# Patient Record
Sex: FEMALE
[De-identification: names, ages, dates, MRNs, and addresses within clinical notes are randomized per-mention and may not be internally consistent; named-entity substitution may affect disease eponyms.]

---
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
## 2022-08-11 ENCOUNTER — OFFICE (OUTPATIENT)
Dept: URBAN - METROPOLITAN AREA CLINIC 79 | Facility: CLINIC | Age: 22
End: 2022-08-11
Payer: MEDICAID

## 2022-08-11 VITALS
HEIGHT: 61 IN | HEART RATE: 81 BPM | TEMPERATURE: 98.1 F | SYSTOLIC BLOOD PRESSURE: 106 MMHG | DIASTOLIC BLOOD PRESSURE: 76 MMHG | WEIGHT: 143 LBS

## 2022-08-11 DIAGNOSIS — R12 HEARTBURN: ICD-10-CM

## 2022-08-11 DIAGNOSIS — R05.3 CHRONIC COUGH: ICD-10-CM

## 2022-08-11 DIAGNOSIS — R10.10 UPPER ABDOMINAL PAIN, UNSPECIFIED: ICD-10-CM

## 2022-08-11 DIAGNOSIS — K59.09 OTHER CONSTIPATION: ICD-10-CM

## 2022-08-11 DIAGNOSIS — R11.0 NAUSEA: ICD-10-CM

## 2022-08-11 DIAGNOSIS — R00.2 PALPITATIONS: ICD-10-CM

## 2022-08-11 PROCEDURE — 99204 OFFICE O/P NEW MOD 45 MIN: CPT | Performed by: PHYSICIAN ASSISTANT

## 2022-08-11 NOTE — SERVICEHPINOTES
JAMIL SILVA   is a   22   year old   white  female who is being seen in consultation at the request of   ANDREW CHAVIRA   for abdominal pain, nausea, heartburn that has been going since May 2022. She recalls having "bad hangover," then ever since that event severe heartburn, bloating, and stomach "pains." She was last seen at [facility] in May for initial evaluation that led to start of Omeprazole 20 mg qd AM and Famotidine 20 mg q hs. She does not want to continue taking PPI due to concern about becoming "dependent" on it Last use of PPI in July 30. She was also seen by PCP in early July for h pylori breath test negative. She has cut down on ETOH. No tobacco nicotine use. Chronic cough worse at night h/o seasonal allergies on Loratadine. Per patient "former" marijuana abuse Last used in May. She also notes constipation new over the past 3 months: BMs qod, BSS type 2. No trial of fiber diet. Poor water intake. Unsure of fiber intake in diet. On ROS "heart palpitations" noted due to anxiety. Rare NSAID use. Denies chest pain, dyspnea, lower abdominal pain, change in BMs, melena, weight loss. br
br She notes allergy to clarithromycin topical gel used for acne and resulting in "rash."  br br br

## 2022-09-14 ENCOUNTER — OFFICE (OUTPATIENT)
Dept: URBAN - METROPOLITAN AREA CLINIC 30 | Facility: CLINIC | Age: 22
End: 2022-09-14
Payer: MEDICAID

## 2022-09-14 VITALS
DIASTOLIC BLOOD PRESSURE: 72 MMHG | OXYGEN SATURATION: 98 % | SYSTOLIC BLOOD PRESSURE: 113 MMHG | HEART RATE: 80 BPM | HEART RATE: 101 BPM | TEMPERATURE: 98.7 F | HEART RATE: 84 BPM | SYSTOLIC BLOOD PRESSURE: 114 MMHG | RESPIRATION RATE: 14 BRPM | SYSTOLIC BLOOD PRESSURE: 126 MMHG | TEMPERATURE: 97.9 F | OXYGEN SATURATION: 100 % | WEIGHT: 143 LBS | HEIGHT: 61 IN

## 2022-09-14 DIAGNOSIS — R12 HEARTBURN: ICD-10-CM

## 2022-09-14 DIAGNOSIS — K20.80 OTHER ESOPHAGITIS WITHOUT BLEEDING: ICD-10-CM

## 2022-09-14 DIAGNOSIS — R00.2 PALPITATIONS: ICD-10-CM

## 2022-09-14 DIAGNOSIS — K44.9 DIAPHRAGMATIC HERNIA WITHOUT OBSTRUCTION OR GANGRENE: ICD-10-CM

## 2022-09-14 DIAGNOSIS — K29.60 OTHER GASTRITIS WITHOUT BLEEDING: ICD-10-CM

## 2022-09-14 DIAGNOSIS — R05.9 COUGH, UNSPECIFIED: ICD-10-CM

## 2022-09-14 DIAGNOSIS — K31.89 OTHER DISEASES OF STOMACH AND DUODENUM: ICD-10-CM

## 2022-09-14 DIAGNOSIS — B96.81 HELICOBACTER PYLORI [H. PYLORI] AS THE CAUSE OF DISEASES CLA: ICD-10-CM

## 2022-09-14 DIAGNOSIS — R11.0 NAUSEA: ICD-10-CM

## 2022-09-14 LAB
GI UPPER EGD HISOLOGY - SPECM 1 JAR(S): 2: (no result)
GI UPPER EGD HISOLOGY - SPECM 1 JAR(S): 2: PDF REPORT: (no result)

## 2022-11-10 ENCOUNTER — OFFICE (OUTPATIENT)
Dept: URBAN - METROPOLITAN AREA CLINIC 79 | Facility: CLINIC | Age: 22
End: 2022-11-10
Payer: MEDICAID

## 2022-11-10 VITALS
HEIGHT: 61 IN | DIASTOLIC BLOOD PRESSURE: 65 MMHG | TEMPERATURE: 97.7 F | HEART RATE: 73 BPM | SYSTOLIC BLOOD PRESSURE: 106 MMHG | WEIGHT: 146 LBS

## 2022-11-10 DIAGNOSIS — B96.81 HELICOBACTER PYLORI [H. PYLORI] AS THE CAUSE OF DISEASES CLA: ICD-10-CM

## 2022-11-10 DIAGNOSIS — K21.9 GASTRO-ESOPHAGEAL REFLUX DISEASE WITHOUT ESOPHAGITIS: ICD-10-CM

## 2022-11-10 PROCEDURE — 99214 OFFICE O/P EST MOD 30 MIN: CPT | Performed by: PHYSICIAN ASSISTANT

## 2022-11-10 NOTE — SERVICEHPINOTES
JAMIL SILVA   is a   21 yo white female who complains of h pylori. Last seen in 08/2022 for upper GI sx that led to EDG. Her 09/2022 EGD + h. pylori so given treatment regimen which was completed on 10/17/2022. She reports symptom resolution since treatment as long as she avoid ETOH. She has cut back on fatty/greasy meals. No longer on PPI. Rare NSAID use. Denies chest pain, n/v, lower abdominal pain, change in BMs, melena, weight loss. No other complaints br

## 2023-01-31 ENCOUNTER — TELEHEALTH PROVIDED OTHER THAN IN PATIENT'S HOME (OUTPATIENT)
Dept: URBAN - METROPOLITAN AREA TELEHEALTH 12 | Facility: TELEHEALTH | Age: 23
End: 2023-01-31
Payer: MEDICAID

## 2023-01-31 VITALS — HEIGHT: 61 IN | WEIGHT: 147 LBS

## 2023-01-31 DIAGNOSIS — K29.80 DUODENITIS WITHOUT BLEEDING: ICD-10-CM

## 2023-01-31 DIAGNOSIS — R12 HEARTBURN: ICD-10-CM

## 2023-01-31 DIAGNOSIS — K44.9 DIAPHRAGMATIC HERNIA WITHOUT OBSTRUCTION OR GANGRENE: ICD-10-CM

## 2023-01-31 DIAGNOSIS — B96.81 HELICOBACTER PYLORI [H. PYLORI] AS THE CAUSE OF DISEASES CLA: ICD-10-CM

## 2023-01-31 PROCEDURE — 99214 OFFICE O/P EST MOD 30 MIN: CPT | Mod: 95 | Performed by: PHYSICIAN ASSISTANT

## 2023-01-31 NOTE — SERVICEHPINOTES
PATIENT VERIFIED BY DATE OF BIRTH AND NAME. Patient has been consented for this telecommunication visit.   Pt is here for f/u regarding recent EGD.  EGD showed increased IELs in the duodenum (possible celiac disease)--pt has to do a celiac panel. She had h pylori and took the abx and tells me that a repeat h pylori stool test after the tx was negative. She continues to get reflux on a fairly consistent basis. Tomatoes, greasy foods, and ETOH are triggers. She hasn't consistently tried Omeprazole. She has no other GI related complaints today. ROS as per HPI and o/w is unremarkable.

## 2023-02-08 LAB
CELIAC DISEASE PANEL: ENDOMYSIAL ANTIBODY IGA: NEGATIVE
CELIAC DISEASE PANEL: IMMUNOGLOBULIN A, QN, SERUM: 247 MG/DL (ref 87–352)
CELIAC DISEASE PANEL: T-TRANSGLUTAMINASE (TTG) IGA: <2 U/ML

## 2023-03-15 ENCOUNTER — TELEHEALTH PROVIDED IN PATIENT'S HOME (OUTPATIENT)
Dept: URBAN - METROPOLITAN AREA TELEHEALTH 3 | Facility: TELEHEALTH | Age: 23
End: 2023-03-15
Payer: MEDICAID

## 2023-03-15 VITALS — HEIGHT: 61 IN | WEIGHT: 148 LBS

## 2023-03-15 DIAGNOSIS — K29.80 DUODENITIS WITHOUT BLEEDING: ICD-10-CM

## 2023-03-15 DIAGNOSIS — R12 HEARTBURN: ICD-10-CM

## 2023-03-15 PROCEDURE — 99213 OFFICE O/P EST LOW 20 MIN: CPT | Mod: 95 | Performed by: PHYSICIAN ASSISTANT

## 2023-03-15 NOTE — SERVICEHPINOTES
PATIENT VERIFIED BY DATE OF BIRTH AND NAME. Patient has been consented for this telecommunication visit.   pt is here for f/u. EGD showed   increased IELs in the duodenum (possible celiac disease)--celiac panel obtained and was neg. She had h pylori and took the abx and an h pylori stool study was negative. She was experiencing reflux on a regular basis so we decided to try omeprazole 20 mg daily for six weeks. She is now here for symptom inventory regarding this. She was given fish oil but this exacerbated symptoms so she stopped it. Heartburn/reflux did improve on the omeprazole OTC but not all the way. She is exercising regularly. She was on Omeprazole 40 mg at one point as prescribed by her PCP and this worked better. Wants to try this. ROS as per HPI and o/w is unremarkable. No other GI related complaints today.